# Patient Record
Sex: FEMALE | Race: WHITE | Employment: UNEMPLOYED | URBAN - NONMETROPOLITAN AREA
[De-identification: names, ages, dates, MRNs, and addresses within clinical notes are randomized per-mention and may not be internally consistent; named-entity substitution may affect disease eponyms.]

---

## 2017-05-18 ENCOUNTER — OFFICE VISIT (OUTPATIENT)
Dept: UROLOGY | Age: 57
End: 2017-05-18

## 2017-05-18 VITALS
BODY MASS INDEX: 29.47 KG/M2 | WEIGHT: 187.8 LBS | DIASTOLIC BLOOD PRESSURE: 72 MMHG | HEIGHT: 67 IN | SYSTOLIC BLOOD PRESSURE: 118 MMHG

## 2017-05-18 DIAGNOSIS — R32 URINARY INCONTINENCE, UNSPECIFIED TYPE: Primary | ICD-10-CM

## 2017-05-18 DIAGNOSIS — Z01.818 PREOP TESTING: ICD-10-CM

## 2017-05-18 LAB
BILIRUBIN URINE: NEGATIVE
BLOOD URINE, POC: NEGATIVE
CHARACTER, URINE: CLEAR
COLOR, URINE: YELLOW
GLUCOSE URINE: NEGATIVE MG/DL
KETONES, URINE: NEGATIVE
LEUKOCYTE CLUMPS, URINE: NEGATIVE
NITRITE, URINE: NEGATIVE
PH, URINE: 5.5
POST VOID RESIDUAL (PVR): 0 ML
PROTEIN, URINE: NORMAL MG/DL
SPECIFIC GRAVITY, URINE: >= 1.03 (ref 1–1.03)
UROBILINOGEN, URINE: 0.2 EU/DL

## 2017-05-18 PROCEDURE — 51798 US URINE CAPACITY MEASURE: CPT | Performed by: NURSE PRACTITIONER

## 2017-05-18 PROCEDURE — 81003 URINALYSIS AUTO W/O SCOPE: CPT | Performed by: NURSE PRACTITIONER

## 2017-05-18 PROCEDURE — 99203 OFFICE O/P NEW LOW 30 MIN: CPT | Performed by: NURSE PRACTITIONER

## 2017-05-18 RX ORDER — SERTRALINE HYDROCHLORIDE 25 MG/1
25 TABLET, FILM COATED ORAL DAILY
COMMUNITY

## 2017-05-18 RX ORDER — OXYBUTYNIN CHLORIDE 5 MG/1
5 TABLET ORAL 2 TIMES DAILY
Qty: 60 TABLET | Refills: 3 | Status: SHIPPED | OUTPATIENT
Start: 2017-05-18 | End: 2017-06-15

## 2017-05-18 RX ORDER — NAPROXEN 250 MG/1
220 TABLET ORAL 2 TIMES DAILY PRN
COMMUNITY
End: 2017-06-13

## 2017-05-18 ASSESSMENT — ENCOUNTER SYMPTOMS
EYES NEGATIVE: 1
SORE THROAT: 0
ABDOMINAL DISTENTION: 0
RHINORRHEA: 0
RESPIRATORY NEGATIVE: 1
VOMITING: 0
DIARRHEA: 0
ALLERGIC/IMMUNOLOGIC NEGATIVE: 1
SHORTNESS OF BREATH: 0
CONSTIPATION: 0
COUGH: 0
NAUSEA: 0
ABDOMINAL PAIN: 1

## 2017-05-19 ENCOUNTER — TELEPHONE (OUTPATIENT)
Dept: UROLOGY | Age: 57
End: 2017-05-19

## 2017-05-23 ENCOUNTER — TELEPHONE (OUTPATIENT)
Dept: UROLOGY | Age: 57
End: 2017-05-23

## 2017-05-24 ENCOUNTER — TELEPHONE (OUTPATIENT)
Dept: UROLOGY | Age: 57
End: 2017-05-24

## 2017-05-26 ENCOUNTER — TELEPHONE (OUTPATIENT)
Dept: UROLOGY | Age: 57
End: 2017-05-26

## 2017-06-05 RX ORDER — HYDROCODONE BITARTRATE AND ACETAMINOPHEN 5; 325 MG/1; MG/1
1 TABLET ORAL EVERY 4 HOURS PRN
Qty: 10 TABLET | Refills: 0 | Status: SHIPPED | OUTPATIENT
Start: 2017-06-05 | End: 2017-06-09

## 2017-06-12 RX ORDER — HYDROCODONE BITARTRATE AND ACETAMINOPHEN 5; 325 MG/1; MG/1
1 TABLET ORAL EVERY 4 HOURS PRN
Qty: 10 TABLET | Refills: 0 | OUTPATIENT
Start: 2017-06-12 | End: 2017-06-16

## 2017-06-13 ENCOUNTER — OFFICE VISIT (OUTPATIENT)
Dept: UROLOGY | Age: 57
End: 2017-06-13

## 2017-06-13 ENCOUNTER — TELEPHONE (OUTPATIENT)
Dept: UROLOGY | Age: 57
End: 2017-06-13

## 2017-06-13 VITALS
WEIGHT: 187 LBS | DIASTOLIC BLOOD PRESSURE: 68 MMHG | HEIGHT: 67 IN | BODY MASS INDEX: 29.35 KG/M2 | SYSTOLIC BLOOD PRESSURE: 112 MMHG

## 2017-06-13 DIAGNOSIS — R10.9 ABDOMINAL PAIN, UNSPECIFIED LOCATION: ICD-10-CM

## 2017-06-13 DIAGNOSIS — N39.46 MIXED INCONTINENCE: Primary | ICD-10-CM

## 2017-06-13 LAB
BILIRUBIN URINE: NEGATIVE
BLOOD URINE, POC: NORMAL
CHARACTER, URINE: CLEAR
COLOR, URINE: YELLOW
GLUCOSE URINE: NEGATIVE MG/DL
KETONES, URINE: NEGATIVE
LEUKOCYTE CLUMPS, URINE: NORMAL
NITRITE, URINE: NEGATIVE
PH, URINE: 6
POST VOID RESIDUAL (PVR): 0 ML
PROTEIN, URINE: NORMAL MG/DL
SPECIFIC GRAVITY, URINE: 1.02 (ref 1–1.03)
UROBILINOGEN, URINE: 0.2 EU/DL

## 2017-06-13 PROCEDURE — 81003 URINALYSIS AUTO W/O SCOPE: CPT | Performed by: NURSE PRACTITIONER

## 2017-06-13 PROCEDURE — 99024 POSTOP FOLLOW-UP VISIT: CPT | Performed by: NURSE PRACTITIONER

## 2017-06-13 PROCEDURE — 51798 US URINE CAPACITY MEASURE: CPT | Performed by: NURSE PRACTITIONER

## 2017-06-13 RX ORDER — ALPRAZOLAM 0.25 MG/1
0.25 TABLET ORAL ONCE
Qty: 1 TABLET | Refills: 0 | Status: SHIPPED | OUTPATIENT
Start: 2017-06-13 | End: 2017-06-13

## 2017-06-13 RX ORDER — HYDROCODONE BITARTRATE AND ACETAMINOPHEN 5; 325 MG/1; MG/1
1 TABLET ORAL EVERY 4 HOURS PRN
Qty: 20 TABLET | Refills: 0 | Status: SHIPPED | OUTPATIENT
Start: 2017-06-13 | End: 2017-06-16

## 2017-06-13 RX ORDER — HYDROCODONE BITARTRATE AND ACETAMINOPHEN 5; 325 MG/1; MG/1
1 TABLET ORAL ONCE
Qty: 1 TABLET | Refills: 0 | Status: SHIPPED | OUTPATIENT
Start: 2017-06-13 | End: 2017-06-13

## 2017-06-14 ENCOUNTER — TELEPHONE (OUTPATIENT)
Dept: UROLOGY | Age: 57
End: 2017-06-14

## 2017-06-15 ENCOUNTER — TELEPHONE (OUTPATIENT)
Dept: UROLOGY | Age: 57
End: 2017-06-15

## 2017-06-15 RX ORDER — CYCLOBENZAPRINE HCL 10 MG
10 TABLET ORAL 3 TIMES DAILY PRN
Qty: 21 TABLET | Refills: 0 | Status: SHIPPED | OUTPATIENT
Start: 2017-06-15 | End: 2017-06-22

## 2017-07-12 ENCOUNTER — OFFICE VISIT (OUTPATIENT)
Dept: UROLOGY | Age: 57
End: 2017-07-12

## 2017-07-12 VITALS
HEIGHT: 67 IN | WEIGHT: 183.1 LBS | BODY MASS INDEX: 28.74 KG/M2 | DIASTOLIC BLOOD PRESSURE: 74 MMHG | SYSTOLIC BLOOD PRESSURE: 118 MMHG

## 2017-07-12 DIAGNOSIS — N39.3 STRESS INCONTINENCE: Primary | ICD-10-CM

## 2017-07-12 LAB
BILIRUBIN URINE: NEGATIVE
BLOOD URINE, POC: NEGATIVE
CHARACTER, URINE: CLEAR
COLOR, URINE: YELLOW
GLUCOSE URINE: NEGATIVE MG/DL
KETONES, URINE: NEGATIVE
LEUKOCYTE CLUMPS, URINE: NEGATIVE
NITRITE, URINE: NEGATIVE
PH, URINE: 5.5
PROTEIN, URINE: NEGATIVE MG/DL
SPECIFIC GRAVITY, URINE: 1.02 (ref 1–1.03)
UROBILINOGEN, URINE: 0.2 EU/DL

## 2017-07-12 PROCEDURE — 99024 POSTOP FOLLOW-UP VISIT: CPT | Performed by: NURSE PRACTITIONER

## 2017-07-12 PROCEDURE — 81003 URINALYSIS AUTO W/O SCOPE: CPT | Performed by: NURSE PRACTITIONER

## 2017-07-12 RX ORDER — CETIRIZINE HYDROCHLORIDE 10 MG/1
10 TABLET ORAL DAILY
COMMUNITY

## 2024-02-06 ENCOUNTER — HOSPITAL ENCOUNTER (OUTPATIENT)
Dept: AUDIOLOGY | Age: 64
Discharge: HOME | End: 2024-02-06

## 2024-02-06 PROCEDURE — 9990000010 HC NO CHARGE VISIT: Performed by: AUDIOLOGIST

## 2024-02-06 NOTE — PROGRESS NOTES
DIAGNOSIS: H90.3, L>R    HEARING AID EVALUATION: Discussed hearing aid options with the patient and .  Reviewed styles and technology.  Decided on Phonak Audeo L30-R with custom cshells, 2P  right, 2UP  left. Color Q2 electric green, pink acrylic cshells.  Insurance is [facility] medicaid. Will send CMN to Dr. Serna and PA request to [facility] upon receipt of CMN. Once PA is received, will order hearing aids and contact patient to schedule fitting when hearing aids are ready.

## 2024-03-08 ENCOUNTER — HOSPITAL ENCOUNTER (OUTPATIENT)
Dept: AUDIOLOGY | Age: 64
Discharge: HOME | End: 2024-03-08
Payer: MEDICAID

## 2024-03-08 PROCEDURE — V5160 DISPENSING FEE BINAURAL: HCPCS | Performed by: AUDIOLOGIST

## 2024-03-08 PROCEDURE — V5261 HEARING AID, DIGIT, BIN, BTE: HCPCS | Performed by: AUDIOLOGIST

## 2024-03-08 NOTE — PROGRESS NOTES
[facility]#: [id number]   ACCT#: [id number]  PAYOR: [employer]  PRIOR AUTH #: N/A   \"NO AUTH REQUIRED FOR CODES REQUESTED AS SERVICING PROVIDER IS IN NETWORK\" 2/12/24    Number of visits allowed: Unlimited visits while in warranty (warranty ends 3/21/27).  Charge for follow up visits: Follow up visits (out of warranty) are subject to current office visit charge.      DIAGNOSIS: H90.3 L>R    NEW HEARING AID FITTING: Patient accompanied by . Fit with binaural Phonak Ensyneo L30-R hearing aids in green w/custom cshells. Size 2P  RIGHT, 2UP  LEFT. Programmed with autosense only for now. VC active. Soundrecover off. Explained care, use and insertion/removal. Gave extra hard case, cleaning brush, cerushield filters and demo'd. Patient declined BT today but may be interested at F/U. Hearing aid fitting recheck scheduled for 3/26.      SPEECH MAPPING:    The [employer] real ear system was used to perform verification of Deb's hearing aid fitting. The output of Deb's binaural amplification was programmed to match appropriate NAL-NAL2 targets for MPO, soft, medium and loud stimuli utilizing speech mapping based on her 1/19/24 audiogram. Reduced user experience to 75% following REM per patient's request. Counseled on acclimitization and benefits of full-time, consistent use during waking hours.    Speech mapping results suggest: Improved SII with amplification.

## 2024-03-26 ENCOUNTER — HOSPITAL ENCOUNTER (OUTPATIENT)
Dept: AUDIOLOGY | Age: 64
Discharge: HOME | End: 2024-03-26

## 2024-03-26 PROCEDURE — 9990000010 HC NO CHARGE VISIT: Performed by: AUDIOLOGIST

## 2024-03-26 NOTE — PROGRESS NOTES
TWO WEEK CHECK: The patient is doing well with the new hearing aids. She reports greater volume perceived on right side compared to left. Patient denies making any volume adjustments. Datalog shows 12hrs/day avg use. Patient confirms. Increased user experience from 75% to 80% and decreased overall gain on right 3dB. Patient reported improved acoustic balance. Increased noiseblock settings across autosense and added manual P1 speech in noise program. Paired aids to patient's iphone and [employer] cara. Completed phone call in office successfully with patient's . Turned off sounds and haptics. Advised patient to turn off unnecessary sound notifications. Scheduled 3-4 week f/u per patient request. Counseled on acclimitization. Reassured patient and encouraged her to continue full-time consistent use of hearing aids during waking hours for optimal benefit. F/U scheduled 4/22. Patient's 's current chemo schedule is every 3rd weds, next scheduled 4/10.

## 2024-04-22 ENCOUNTER — HOSPITAL ENCOUNTER (OUTPATIENT)
Dept: AUDIOLOGY | Age: 64
Discharge: HOME | End: 2024-04-22

## 2024-04-22 PROCEDURE — 9990000010 HC NO CHARGE VISIT: Performed by: AUDIOLOGIST

## 2024-04-22 NOTE — PROGRESS NOTES
THREE WEEK CHECK: The patient is doing well with the new hearing aids. Increased user experience from 80 to 90% today. Reviewed use of cara. Reviewed general cleaning and gave supply of cerustop filters. Reassured patient and reviewed acclimitization and benefits of full-time consistent use. Scheduled 6 month hearing aid check per patient request. Will see patient sooner if any problems arise.

## 2024-10-28 ENCOUNTER — HOSPITAL ENCOUNTER (OUTPATIENT)
Dept: AUDIOLOGY | Age: 64
Discharge: HOME | End: 2024-10-28